# Patient Record
Sex: FEMALE | Race: WHITE | Employment: OTHER | URBAN - METROPOLITAN AREA
[De-identification: names, ages, dates, MRNs, and addresses within clinical notes are randomized per-mention and may not be internally consistent; named-entity substitution may affect disease eponyms.]

---

## 2020-10-20 ENCOUNTER — TELEPHONE (OUTPATIENT)
Dept: INTERNAL MEDICINE | Facility: CLINIC | Age: 70
End: 2020-10-20

## 2020-10-20 RX ORDER — ATORVASTATIN CALCIUM 10 MG/1
10 TABLET, FILM COATED ORAL DAILY
Qty: 90 TABLET | Refills: 1 | Status: SHIPPED | OUTPATIENT
Start: 2020-10-20

## 2020-10-20 RX ORDER — ALENDRONATE SODIUM 70 MG/75ML
70 SOLUTION ORAL
Qty: 12 ML | Refills: 1 | Status: SHIPPED | OUTPATIENT
Start: 2020-10-20 | End: 2020-10-27

## 2020-10-20 RX ORDER — LISINOPRIL 10 MG/1
10 TABLET ORAL DAILY
Qty: 90 TABLET | Refills: 0 | Status: SHIPPED | OUTPATIENT
Start: 2020-10-20 | End: 2021-01-18

## 2020-10-20 NOTE — TELEPHONE ENCOUNTER
Patient called requesting the following medication refills;    Lisinopril 10 MG Daily  atordastatin 10 MG daily  Alendronate 70MG once weekly.    Thank You

## 2020-10-27 ENCOUNTER — TELEPHONE (OUTPATIENT)
Dept: INTERNAL MEDICINE | Facility: CLINIC | Age: 70
End: 2020-10-27

## 2020-10-27 RX ORDER — ALENDRONATE SODIUM 70 MG/1
70 TABLET ORAL
Qty: 12 TABLET | Refills: 3 | Status: SHIPPED | OUTPATIENT
Start: 2020-10-27

## 2020-10-27 NOTE — TELEPHONE ENCOUNTER
PATIENT STATES THE WRONG RX WAS SENT TO [facility]. SHE DOES NOT TAKE SOLUTION OF FOSAMAX, SHE TAKES ONE TABLET ONCE A WEEK 70 MG TABLET. [facility] - [address] [phone] [facility] [phone] FX     PLEASE ADVISE  [phone]

## 2020-11-17 ENCOUNTER — TELEPHONE (OUTPATIENT)
Dept: INTERNAL MEDICINE | Facility: CLINIC | Age: 70
End: 2020-11-17

## 2020-12-01 PROBLEM — Z80.0 FAMILY HISTORY OF COLON CANCER: Status: ACTIVE | Noted: 2020-12-01

## 2020-12-01 PROBLEM — Z80.0 FAMILY HISTORY OF COLON CANCER: Status: RESOLVED | Noted: 2020-12-01 | Resolved: 2020-12-01

## 2020-12-01 NOTE — PROGRESS NOTES
Subjective   Brenda Lanier is a 70 y.o. female.     Chief Complaint   Patient presents with   • Hypertension     6 month follow-up    • Hyperlipidemia       History of Present Illness   This is a 70-year-old female with a past medical history significant for benign essential hypertension, hyperlipidemia, renal insufficiency, osteoporosis, history of colon polyps and a vitamin D deficiency who presents for a routine follow-up on labs and medications.  Patient is currently not experiencing any side effects or problems with her current medications.  The patient was recently diagnosed with rosacea by her dermatologist who has treated her with MetroGel, she has seen some improvement and feels that the exacerbation of rosacea may be related to wearing a mask.  The patient has been getting less exercise recently but does get some walking.  She is using her alendronate on a regular basis and has not had any heartburn or reflux symptoms with its use.  She currently does not take any nonsteroidal anti-inflammatory agents.  She has not had any chest pain, no shortness of air or heart palpitations.  No other voiced complaints.      The following portions of the patient's history were reviewed and updated as appropriate: allergies, current medications, past family history, past medical history, past social history, past surgical history and problem list.    Depression Screen:  PHQ-2/PHQ-9 Depression Screening 12/15/2020   Little interest or pleasure in doing things 0   Feeling down, depressed, or hopeless 0   Total Score 0       Assessment/Plan   Diagnoses and all orders for this visit:    1. Vitamin D deficiency (Primary)    2. Renal insufficiency, mild    3. Age-related osteoporosis without current pathological fracture    4. Essential hypertension    5. Mixed hyperlipidemia    6. History of colon polyps    7. Asymptomatic menopausal state  -     DEXA Bone Density Axial; Future    8. Encounter for screening mammogram for malignant  neoplasm of breast  -     Mammo Screening Bilateral With CAD; Future    #1.  Benign essential hypertension-blood pressure is well controlled, would recommend continuing current treatment.  2.  Hyperlipidemia-LDL goal is less than 130, total cholesterol 171, HDL 50, triglycerides 95 and , LDL/HDL ratio is 2.1, patient is currently on atorvastatin 10 mg once daily, recommend continue current treatment.  3.  Renal insufficiency-BUN is 16 with a creatinine of 0.95 and an estimated glomerular filtration rate of 61, would recommend the patient continue to hydrate well with water, avoid all nonsteroidal anti-inflammatory agents, any Edmond 2 inhibitors, any proton pump inhibitors if possible as well as any nephrotoxic agents.  Would also recommend that the patient avoid excess salt and sodium in the diet, will continue to monitor the renal function on a regular basis.  4.  Osteoporosis-last DEXA scan was completed in December 2018, patient is due for a repeat DEXA scan, patient is currently on alendronate 70 mg once weekly, we did discuss the importance of calcium in the diet as well as calcium and vitamin D supplements as indicated and the importance of weightbearing exercise, would recommend that a DEXA scan be rechecked in 2 years.  5.  Vitamin D deficiency-level is 60.2, continue current treatment with vitamin D3  6.  History of colon polyps-last colonoscopy was completed in January 2018 and should be repeated in 10 years with Dr. Bond.    Would recommend a 6-month wellness exam with lab-CMP, CBC, lipid, vitamin D         Past Medical History:   Diagnosis Date   • Elevated transaminase level    • Encounter for Medicare annual wellness exam    • History of colon polyps    • Hyperlipidemia    • Hypertension    • Osteoarthritis    • Osteopenia with high risk of fracture    • Osteoporosis    • Renal insufficiency, mild    • Vaginal atrophy    • Vitamin D deficiency        History reviewed. No pertinent surgical  "history.    Family History   Problem Relation Age of Onset   • Osteoporosis Mother    • Hypertension Mother    • Hypertension Father    • Hyperlipidemia Father    • No Known Problems Sister    • Blindness Maternal Grandmother    • Cataracts Maternal Grandmother    • Heart attack Maternal Grandmother    • Heart disease Maternal Grandfather    • Coronary artery disease Paternal Grandmother    • Peripheral vascular disease Paternal Grandfather    • Hypertension Paternal Grandfather    • No Known Problems Sister    • Throat cancer Sister        Social History     Socioeconomic History   • Marital status:      Spouse name: Not on file   • Number of children: Not on file   • Years of education: Not on file   • Highest education level: Not on file   Tobacco Use   • Smoking status: Never Smoker   Substance and Sexual Activity   • Alcohol use: Yes       Current Outpatient Medications   Medication Sig Dispense Refill   • alendronate (Fosamax) 70 MG tablet Take 1 tablet by mouth Every 7 (Seven) Days. 12 tablet 3   • atorvastatin (LIPITOR) 10 MG tablet Take 1 tablet by mouth Daily. 90 tablet 1   • CALCIUM PO Take 2,000 mg by mouth.     • lisinopril (PRINIVIL,ZESTRIL) 10 MG tablet Take 1 tablet by mouth Daily. 90 tablet 0   • metroNIDAZOLE (METROCREAM) 0.75 % cream APPLY TO FACE EVERY MORNING     • VITAMIN D PO Take 5,000 mg by mouth.       No current facility-administered medications for this visit.        Review of Systems   All other systems reviewed and are negative.      Objective   /76 (BP Location: Left arm, Patient Position: Sitting, Cuff Size: Adult)   Pulse 63   Temp 97.8 °F (36.6 °C) (Temporal)   Resp 18   Ht 161.3 cm (63.5\")   Wt 66.2 kg (146 lb)   LMP  (LMP Unknown)   SpO2 95%   Breastfeeding No   BMI 25.46 kg/m²     Physical Exam        Recent Results (from the past 2016 hour(s))   Comprehensive Metabolic Panel    Collection Time: 12/09/20  7:45 AM    Specimen: Blood   Result Value Ref Range "    Glucose 88 65 - 99 mg/dL    BUN 16 8 - 27 mg/dL    Creatinine 0.95 0.57 - 1.00 mg/dL    eGFR Non African Am 61 >59 mL/min/1.73    eGFR African Am 71 >59 mL/min/1.73    BUN/Creatinine Ratio 17 12 - 28    Sodium 141 134 - 144 mmol/L    Potassium 4.5 3.5 - 5.2 mmol/L    Chloride 104 96 - 106 mmol/L    Total CO2 25 20 - 29 mmol/L    Calcium 10.0 8.7 - 10.3 mg/dL    Total Protein 6.8 6.0 - 8.5 g/dL    Albumin 4.6 3.8 - 4.8 g/dL    Globulin 2.2 1.5 - 4.5 g/dL    A/G Ratio 2.1 1.2 - 2.2    Total Bilirubin 0.4 0.0 - 1.2 mg/dL    Alkaline Phosphatase 41 39 - 117 IU/L    AST (SGOT) 18 0 - 40 IU/L    ALT (SGPT) 14 0 - 32 IU/L   Lipid Panel With LDL / HDL Ratio    Collection Time: 12/09/20  7:45 AM    Specimen: Blood   Result Value Ref Range    Total Cholesterol 171 100 - 199 mg/dL    Triglycerides 95 0 - 149 mg/dL    HDL Cholesterol 50 >39 mg/dL    VLDL Cholesterol Junior 17 5 - 40 mg/dL    LDL Chol Calc (NIH) 104 (H) 0 - 99 mg/dL    LDL/HDL RATIO 2.1 0.0 - 3.2 ratio   Vitamin D 25 Hydroxy    Collection Time: 12/09/20  7:45 AM    Specimen: Blood   Result Value Ref Range    25 Hydroxy, Vitamin D 60.2 30.0 - 100.0 ng/mL   CBC & Differential    Collection Time: 12/09/20  7:45 AM    Specimen: Blood   Result Value Ref Range    WBC 4.7 3.4 - 10.8 x10E3/uL    RBC 3.86 3.77 - 5.28 x10E6/uL    Hemoglobin 12.3 11.1 - 15.9 g/dL    Hematocrit 36.8 34.0 - 46.6 %    MCV 95 79 - 97 fL    MCH 31.9 26.6 - 33.0 pg    MCHC 33.4 31.5 - 35.7 g/dL    RDW 13.0 11.7 - 15.4 %    Platelets 247 150 - 450 x10E3/uL    Neutrophil Rel % 35 Not Estab. %    Lymphocyte Rel % 53 Not Estab. %    Monocyte Rel % 9 Not Estab. %    Eosinophil Rel % 2 Not Estab. %    Basophil Rel % 1 Not Estab. %    Neutrophils Absolute 1.6 1.4 - 7.0 x10E3/uL    Lymphocytes Absolute 2.5 0.7 - 3.1 x10E3/uL    Monocytes Absolute 0.4 0.1 - 0.9 x10E3/uL    Eosinophils Absolute 0.1 0.0 - 0.4 x10E3/uL    Basophils Absolute 0.0 0.0 - 0.2 x10E3/uL    Immature Granulocyte Rel % 0 Not Estab. %     Immature Grans Absolute 0.0 0.0 - 0.1 x10E3/uL

## 2020-12-15 ENCOUNTER — OFFICE VISIT (OUTPATIENT)
Dept: INTERNAL MEDICINE | Facility: CLINIC | Age: 70
End: 2020-12-15

## 2020-12-15 VITALS
BODY MASS INDEX: 24.92 KG/M2 | SYSTOLIC BLOOD PRESSURE: 118 MMHG | HEART RATE: 63 BPM | OXYGEN SATURATION: 95 % | TEMPERATURE: 97.8 F | DIASTOLIC BLOOD PRESSURE: 76 MMHG | WEIGHT: 146 LBS | HEIGHT: 64 IN | RESPIRATION RATE: 18 BRPM

## 2020-12-15 DIAGNOSIS — I10 ESSENTIAL HYPERTENSION: ICD-10-CM

## 2020-12-15 DIAGNOSIS — E55.9 VITAMIN D DEFICIENCY: Primary | ICD-10-CM

## 2020-12-15 DIAGNOSIS — M81.0 AGE-RELATED OSTEOPOROSIS WITHOUT CURRENT PATHOLOGICAL FRACTURE: ICD-10-CM

## 2020-12-15 DIAGNOSIS — N28.9 RENAL INSUFFICIENCY, MILD: ICD-10-CM

## 2020-12-15 DIAGNOSIS — E78.2 MIXED HYPERLIPIDEMIA: ICD-10-CM

## 2020-12-15 DIAGNOSIS — Z78.0 ASYMPTOMATIC MENOPAUSAL STATE: ICD-10-CM

## 2020-12-15 DIAGNOSIS — Z12.31 ENCOUNTER FOR SCREENING MAMMOGRAM FOR MALIGNANT NEOPLASM OF BREAST: ICD-10-CM

## 2020-12-15 DIAGNOSIS — Z86.010 HISTORY OF COLON POLYPS: ICD-10-CM

## 2020-12-15 PROCEDURE — 99214 OFFICE O/P EST MOD 30 MIN: CPT | Performed by: INTERNAL MEDICINE

## 2021-01-18 RX ORDER — LISINOPRIL 10 MG/1
TABLET ORAL
Qty: 30 TABLET | Refills: 1 | Status: SHIPPED | OUTPATIENT
Start: 2021-01-18

## 2023-05-30 ENCOUNTER — PREP FOR SURGERY (OUTPATIENT)
Dept: SURGERY | Facility: SURGERY CENTER | Age: 73
End: 2023-05-30

## 2023-05-30 DIAGNOSIS — Z12.11 ENCOUNTER FOR SCREENING FOR MALIGNANT NEOPLASM OF COLON: Primary | ICD-10-CM

## 2023-05-31 RX ORDER — SODIUM CHLORIDE, SODIUM LACTATE, POTASSIUM CHLORIDE, CALCIUM CHLORIDE 600; 310; 30; 20 MG/100ML; MG/100ML; MG/100ML; MG/100ML
30 INJECTION, SOLUTION INTRAVENOUS CONTINUOUS PRN
OUTPATIENT
Start: 2023-05-31